# Patient Record
Sex: FEMALE | Race: BLACK OR AFRICAN AMERICAN | NOT HISPANIC OR LATINO | Employment: FULL TIME | URBAN - METROPOLITAN AREA
[De-identification: names, ages, dates, MRNs, and addresses within clinical notes are randomized per-mention and may not be internally consistent; named-entity substitution may affect disease eponyms.]

---

## 2024-06-15 ENCOUNTER — HOSPITAL ENCOUNTER (OUTPATIENT)
Facility: HOSPITAL | Age: 29
Discharge: HOME | End: 2024-06-15
Attending: OBSTETRICS & GYNECOLOGY | Admitting: OBSTETRICS & GYNECOLOGY
Payer: COMMERCIAL

## 2024-06-15 VITALS
DIASTOLIC BLOOD PRESSURE: 67 MMHG | TEMPERATURE: 98 F | BODY MASS INDEX: 44.3 KG/M2 | SYSTOLIC BLOOD PRESSURE: 122 MMHG | WEIGHT: 250 LBS | OXYGEN SATURATION: 95 % | HEART RATE: 79 BPM | RESPIRATION RATE: 16 BRPM | HEIGHT: 63 IN

## 2024-06-15 PROBLEM — Z34.90 PREGNANT AND NOT YET DELIVERED: Status: ACTIVE | Noted: 2024-06-15

## 2024-06-15 LAB
ALBUMIN SERPL-MCNC: 3.3 G/DL (ref 3.5–5.2)
ALBUMIN/GLOB SERPL: 0.8 G/DL
ALP SERPL-CCNC: 146 U/L (ref 39–117)
ALT SERPL W P-5'-P-CCNC: 5 U/L (ref 1–33)
ANION GAP SERPL CALCULATED.3IONS-SCNC: 10.8 MMOL/L (ref 5–15)
AST SERPL-CCNC: 13 U/L (ref 1–32)
BILIRUB SERPL-MCNC: 0.3 MG/DL (ref 0–1.2)
BUN SERPL-MCNC: 4 MG/DL (ref 6–20)
BUN/CREAT SERPL: 7.3 (ref 7–25)
CALCIUM SPEC-SCNC: 8.6 MG/DL (ref 8.6–10.5)
CHLORIDE SERPL-SCNC: 106 MMOL/L (ref 98–107)
CO2 SERPL-SCNC: 22.2 MMOL/L (ref 22–29)
CREAT SERPL-MCNC: 0.55 MG/DL (ref 0.57–1)
CREAT UR-MCNC: 71.1 MG/DL
DEPRECATED RDW RBC AUTO: 39 FL (ref 37–54)
EGFRCR SERPLBLD CKD-EPI 2021: 126.6 ML/MIN/1.73
ERYTHROCYTE [DISTWIDTH] IN BLOOD BY AUTOMATED COUNT: 16 % (ref 12.3–15.4)
GLOBULIN UR ELPH-MCNC: 4 GM/DL
GLUCOSE SERPL-MCNC: 69 MG/DL (ref 65–99)
HCT VFR BLD AUTO: 31.3 % (ref 34–46.6)
HGB BLD-MCNC: 9.2 G/DL (ref 12–15.9)
MCH RBC QN AUTO: 20.2 PG (ref 26.6–33)
MCHC RBC AUTO-ENTMCNC: 29.4 G/DL (ref 31.5–35.7)
MCV RBC AUTO: 68.6 FL (ref 79–97)
PLATELET # BLD AUTO: 463 10*3/MM3 (ref 140–450)
PMV BLD AUTO: 9.9 FL (ref 6–12)
POTASSIUM SERPL-SCNC: 4.1 MMOL/L (ref 3.5–5.2)
PROT ?TM UR-MCNC: 9.9 MG/DL
PROT SERPL-MCNC: 7.3 G/DL (ref 6–8.5)
PROT/CREAT UR: 139.2 MG/G CREA (ref 0–200)
RBC # BLD AUTO: 4.56 10*6/MM3 (ref 3.77–5.28)
SODIUM SERPL-SCNC: 139 MMOL/L (ref 136–145)
WBC NRBC COR # BLD AUTO: 8.77 10*3/MM3 (ref 3.4–10.8)

## 2024-06-15 PROCEDURE — 82570 ASSAY OF URINE CREATININE: CPT | Performed by: OBSTETRICS & GYNECOLOGY

## 2024-06-15 PROCEDURE — 85027 COMPLETE CBC AUTOMATED: CPT | Performed by: OBSTETRICS & GYNECOLOGY

## 2024-06-15 PROCEDURE — 84156 ASSAY OF PROTEIN URINE: CPT | Performed by: OBSTETRICS & GYNECOLOGY

## 2024-06-15 PROCEDURE — G0463 HOSPITAL OUTPT CLINIC VISIT: HCPCS

## 2024-06-15 PROCEDURE — 80053 COMPREHEN METABOLIC PANEL: CPT | Performed by: OBSTETRICS & GYNECOLOGY

## 2024-06-15 RX ORDER — PRENATAL VIT NO.126/IRON/FOLIC 28MG-0.8MG
1 TABLET ORAL DAILY
COMMUNITY

## 2024-06-15 RX ORDER — SENNOSIDES 8.6 MG
650 CAPSULE ORAL EVERY 8 HOURS PRN
COMMUNITY

## 2024-07-02 ENCOUNTER — HOSPITAL ENCOUNTER (OUTPATIENT)
Facility: HOSPITAL | Age: 29
Setting detail: OBSERVATION
Discharge: HOME | End: 2024-07-03
Attending: OBSTETRICS & GYNECOLOGY | Admitting: OBSTETRICS & GYNECOLOGY
Payer: COMMERCIAL

## 2024-07-02 PROBLEM — Z34.90 PREGNANCY: Status: ACTIVE | Noted: 2024-07-02

## 2024-07-02 LAB
ALBUMIN SERPL-MCNC: 3.4 G/DL (ref 3.5–5.2)
ALBUMIN/GLOB SERPL: 0.8 G/DL
ALP SERPL-CCNC: 193 U/L (ref 39–117)
ALT SERPL W P-5'-P-CCNC: 5 U/L (ref 1–33)
ANION GAP SERPL CALCULATED.3IONS-SCNC: 12.5 MMOL/L (ref 5–15)
AST SERPL-CCNC: 28 U/L (ref 1–32)
BASOPHILS # BLD AUTO: 0.01 10*3/MM3 (ref 0–0.2)
BASOPHILS NFR BLD AUTO: 0.1 % (ref 0–1.5)
BILIRUB SERPL-MCNC: 0.4 MG/DL (ref 0–1.2)
BUN SERPL-MCNC: 3 MG/DL (ref 6–20)
BUN/CREAT SERPL: 5.9 (ref 7–25)
CALCIUM SPEC-SCNC: 8.6 MG/DL (ref 8.6–10.5)
CHLORIDE SERPL-SCNC: 106 MMOL/L (ref 98–107)
CO2 SERPL-SCNC: 19.5 MMOL/L (ref 22–29)
CREAT SERPL-MCNC: 0.51 MG/DL (ref 0.57–1)
CREAT UR-MCNC: 73.2 MG/DL
DEPRECATED RDW RBC AUTO: 45 FL (ref 37–54)
EGFRCR SERPLBLD CKD-EPI 2021: 129 ML/MIN/1.73
EOSINOPHIL # BLD AUTO: 0.02 10*3/MM3 (ref 0–0.4)
EOSINOPHIL NFR BLD AUTO: 0.2 % (ref 0.3–6.2)
ERYTHROCYTE [DISTWIDTH] IN BLOOD BY AUTOMATED COUNT: 19.2 % (ref 12.3–15.4)
GLOBULIN UR ELPH-MCNC: 4.1 GM/DL
GLUCOSE SERPL-MCNC: 69 MG/DL (ref 65–99)
HCT VFR BLD AUTO: 33.8 % (ref 34–46.6)
HGB BLD-MCNC: 9.9 G/DL (ref 12–15.9)
HOLD SPECIMEN: NORMAL
IMM GRANULOCYTES # BLD AUTO: 0.05 10*3/MM3 (ref 0–0.05)
IMM GRANULOCYTES NFR BLD AUTO: 0.5 % (ref 0–0.5)
LYMPHOCYTES # BLD AUTO: 3.27 10*3/MM3 (ref 0.7–3.1)
LYMPHOCYTES NFR BLD AUTO: 35.1 % (ref 19.6–45.3)
MCH RBC QN AUTO: 20.5 PG (ref 26.6–33)
MCHC RBC AUTO-ENTMCNC: 29.3 G/DL (ref 31.5–35.7)
MCV RBC AUTO: 70.1 FL (ref 79–97)
MONOCYTES # BLD AUTO: 0.51 10*3/MM3 (ref 0.1–0.9)
MONOCYTES NFR BLD AUTO: 5.5 % (ref 5–12)
NEUTROPHILS NFR BLD AUTO: 5.46 10*3/MM3 (ref 1.7–7)
NEUTROPHILS NFR BLD AUTO: 58.6 % (ref 42.7–76)
NRBC BLD AUTO-RTO: 0 /100 WBC (ref 0–0.2)
PLATELET # BLD AUTO: 497 10*3/MM3 (ref 140–450)
PMV BLD AUTO: 10.7 FL (ref 6–12)
POTASSIUM SERPL-SCNC: 4.6 MMOL/L (ref 3.5–5.2)
PROT ?TM UR-MCNC: 20.5 MG/DL
PROT SERPL-MCNC: 7.5 G/DL (ref 6–8.5)
PROT/CREAT UR: 280.1 MG/G CREA (ref 0–200)
RBC # BLD AUTO: 4.82 10*6/MM3 (ref 3.77–5.28)
SODIUM SERPL-SCNC: 138 MMOL/L (ref 136–145)
WBC NRBC COR # BLD AUTO: 9.32 10*3/MM3 (ref 3.4–10.8)

## 2024-07-02 PROCEDURE — G0463 HOSPITAL OUTPT CLINIC VISIT: HCPCS

## 2024-07-02 PROCEDURE — 25810000003 LACTATED RINGERS SOLUTION: Performed by: OBSTETRICS & GYNECOLOGY

## 2024-07-02 PROCEDURE — 84156 ASSAY OF PROTEIN URINE: CPT | Performed by: OBSTETRICS & GYNECOLOGY

## 2024-07-02 PROCEDURE — 85025 COMPLETE CBC W/AUTO DIFF WBC: CPT | Performed by: OBSTETRICS & GYNECOLOGY

## 2024-07-02 PROCEDURE — 82570 ASSAY OF URINE CREATININE: CPT | Performed by: OBSTETRICS & GYNECOLOGY

## 2024-07-02 PROCEDURE — 80053 COMPREHEN METABOLIC PANEL: CPT | Performed by: OBSTETRICS & GYNECOLOGY

## 2024-07-02 PROCEDURE — G0378 HOSPITAL OBSERVATION PER HR: HCPCS

## 2024-07-02 RX ORDER — ASPIRIN 81 MG/1
81 TABLET, CHEWABLE ORAL DAILY
COMMUNITY

## 2024-07-02 RX ORDER — LIDOCAINE HYDROCHLORIDE 10 MG/ML
0.5 INJECTION, SOLUTION EPIDURAL; INFILTRATION; INTRACAUDAL; PERINEURAL ONCE AS NEEDED
Status: DISCONTINUED | OUTPATIENT
Start: 2024-07-02 | End: 2024-07-02 | Stop reason: HOSPADM

## 2024-07-02 RX ORDER — FERROUS SULFATE 324(65)MG
324 TABLET, DELAYED RELEASE (ENTERIC COATED) ORAL
COMMUNITY
End: 2024-07-12 | Stop reason: HOSPADM

## 2024-07-02 RX ORDER — SODIUM CHLORIDE, SODIUM LACTATE, POTASSIUM CHLORIDE, CALCIUM CHLORIDE 600; 310; 30; 20 MG/100ML; MG/100ML; MG/100ML; MG/100ML
125 INJECTION, SOLUTION INTRAVENOUS CONTINUOUS
Status: DISCONTINUED | OUTPATIENT
Start: 2024-07-02 | End: 2024-07-03 | Stop reason: HOSPADM

## 2024-07-02 RX ORDER — SODIUM CHLORIDE 0.9 % (FLUSH) 0.9 %
10 SYRINGE (ML) INJECTION EVERY 12 HOURS SCHEDULED
Status: DISCONTINUED | OUTPATIENT
Start: 2024-07-02 | End: 2024-07-02 | Stop reason: HOSPADM

## 2024-07-02 RX ORDER — SODIUM CHLORIDE 0.9 % (FLUSH) 0.9 %
10 SYRINGE (ML) INJECTION AS NEEDED
Status: DISCONTINUED | OUTPATIENT
Start: 2024-07-02 | End: 2024-07-02 | Stop reason: HOSPADM

## 2024-07-02 RX ORDER — SODIUM CHLORIDE 9 MG/ML
40 INJECTION, SOLUTION INTRAVENOUS AS NEEDED
Status: DISCONTINUED | OUTPATIENT
Start: 2024-07-02 | End: 2024-07-02 | Stop reason: HOSPADM

## 2024-07-02 RX ADMIN — SODIUM CHLORIDE, SODIUM LACTATE, POTASSIUM CHLORIDE, AND CALCIUM CHLORIDE 500 ML: 600; 310; 30; 20 INJECTION, SOLUTION INTRAVENOUS at 19:12

## 2024-07-03 VITALS
RESPIRATION RATE: 19 BRPM | HEART RATE: 72 BPM | TEMPERATURE: 98.4 F | SYSTOLIC BLOOD PRESSURE: 136 MMHG | HEIGHT: 63 IN | DIASTOLIC BLOOD PRESSURE: 83 MMHG | BODY MASS INDEX: 43.87 KG/M2 | OXYGEN SATURATION: 99 % | WEIGHT: 247.58 LBS

## 2024-07-03 LAB
COLLECT DURATION TIME UR: 12 HRS
PROT 24H UR-MRATE: 111.7 MG/24HOURS (ref 0–150)
SPECIMEN VOL 24H UR: 570 ML

## 2024-07-03 PROCEDURE — 96361 HYDRATE IV INFUSION ADD-ON: CPT

## 2024-07-03 PROCEDURE — G0378 HOSPITAL OBSERVATION PER HR: HCPCS

## 2024-07-03 PROCEDURE — 25810000003 LACTATED RINGERS PER 1000 ML: Performed by: OBSTETRICS & GYNECOLOGY

## 2024-07-03 PROCEDURE — 81050 URINALYSIS VOLUME MEASURE: CPT | Performed by: OBSTETRICS & GYNECOLOGY

## 2024-07-03 PROCEDURE — 84156 ASSAY OF PROTEIN URINE: CPT | Performed by: OBSTETRICS & GYNECOLOGY

## 2024-07-03 PROCEDURE — 96360 HYDRATION IV INFUSION INIT: CPT

## 2024-07-03 RX ADMIN — SODIUM CHLORIDE, SODIUM LACTATE, POTASSIUM CHLORIDE, AND CALCIUM CHLORIDE 125 ML/HR: .6; .31; .03; .02 INJECTION, SOLUTION INTRAVENOUS at 02:16

## 2024-07-03 NOTE — PLAN OF CARE
Goal Outcome Evaluation:         Blood pressures stable overnight, tolerating regular diet, voiding adequately. No c/o contractions, pain, or leakage of fluid

## 2024-07-03 NOTE — SIGNIFICANT NOTE
Case Management Discharge Note                Selected Continued Care - Discharged on 7/3/2024 Admission date: 7/2/2024 - Discharge disposition: Home or Self Care              Transportation Services  Private: Car    Final Discharge Disposition Code: (P) 01 - home or self-care

## 2024-07-03 NOTE — DISCHARGE SUMMARY
Date of Discharge:  7/3/2024    Discharge Diagnosis: ***    Presenting Problem/History of Present Illness:  Active Hospital Problems    Diagnosis  POA    **Pregnancy [Z34.90]  Not Applicable      Resolved Hospital Problems   No resolved problems to display.        ***  Hospital Course:  Patient is a 29 y.o.  presented with ***    Procedures Performed:         Consults:   Consults       No orders found for last 30 day(s).            Pertinent Test Results:   Lab Results (last 72 hours)       Procedure Component Value Units Date/Time    Protein, Urine, 24 Hour - Urine, Clean Catch [[id number]] Collected: 24    Specimen: 24 Hour Urine from Urine, Clean Catch Updated: 24     Protein, 24H Urine 111.7 mg/24hours      24H Urine Volume 570 mL      Time (Hours) 12 hrs     CBC & Differential [[id number]]  (Abnormal) Collected: 24    Specimen: Blood from Hand, Right Updated: 24    Narrative:      The following orders were created for panel order CBC & Differential.  Procedure                               Abnormality         Status                     ---------                               -----------         ------                     CBC Auto Differential[[id number]]        Abnormal            Final result               Scan Slide[[id number]]                                                                    Please view results for these tests on the individual orders.    CBC Auto Differential [[id number]]  (Abnormal) Collected: 24    Specimen: Blood from Hand, Right Updated: 24     WBC 9.32 10*3/mm3      RBC 4.82 10*6/mm3      Hemoglobin 9.9 g/dL      Hematocrit 33.8 %      MCV 70.1 fL      MCH 20.5 pg      MCHC 29.3 g/dL      RDW 19.2 %      RDW-SD 45.0 fl      MPV 10.7 fL      Platelets 497 10*3/mm3      Neutrophil % 58.6 %      Lymphocyte % 35.1 %      Monocyte % 5.5 %      Eosinophil % 0.2 %      Basophil % 0.1 %      Immature Grans % 0.5 %       Neutrophils, Absolute 5.46 10*3/mm3      Lymphocytes, Absolute 3.27 10*3/mm3      Monocytes, Absolute 0.51 10*3/mm3      Eosinophils, Absolute 0.02 10*3/mm3      Basophils, Absolute 0.01 10*3/mm3      Immature Grans, Absolute 0.05 10*3/mm3      nRBC 0.0 /100 WBC     Narrative:      Appended report. These results have been appended to a previously verified report.    Comprehensive Metabolic Panel [[id number]]  (Abnormal) Collected: 07/02/24 1753    Specimen: Blood from Hand, Right Updated: 07/02/24 1957     Glucose 69 mg/dL      BUN 3 mg/dL      Creatinine 0.51 mg/dL      Sodium 138 mmol/L      Potassium 4.6 mmol/L      Comment: Slight hemolysis detected by analyzer. Result may be falsely elevated.        Chloride 106 mmol/L      CO2 19.5 mmol/L      Calcium 8.6 mg/dL      Total Protein 7.5 g/dL      Albumin 3.4 g/dL      ALT (SGPT) 5 U/L      AST (SGOT) 28 U/L      Comment: Slight hemolysis detected by analyzer. Result may be falsely elevated.        Alkaline Phosphatase 193 U/L      Total Bilirubin 0.4 mg/dL      Globulin 4.1 gm/dL      A/G Ratio 0.8 g/dL      BUN/Creatinine Ratio 5.9     Anion Gap 12.5 mmol/L      eGFR 129.0 mL/min/1.73     Narrative:      GFR Normal >60  Chronic Kidney Disease <60  Kidney Failure <15      Protein / Creatinine Ratio, Urine - Urine, Clean Catch [[id number]]  (Abnormal) Collected: 07/02/24 1718    Specimen: Urine, Clean Catch Updated: 07/02/24 1951     Protein/Creatinine Ratio, Urine 280.1 mg/G Crea      Creatinine, Urine 73.2 mg/dL      Total Protein, Urine 20.5 mg/dL     Emmalena Draw [[id number]] Collected: 07/02/24 1753    Specimen: Blood from Hand, Right Updated: 07/02/24 1931    Narrative:      The following orders were created for panel order Emmalena Draw.  Procedure                               Abnormality         Status                     ---------                               -----------         ------                     Gold Top - SST[[id number]]                                    Final result                 Please view results for these tests on the individual orders.    [facility] [[id number]] Collected: 07/02/24 1753    Specimen: Blood from Hand, Right Updated: 07/02/24 1931     Extra Tube Hold for add-ons.     Comment: Auto resulted.             Imaging Results (Last 72 Hours)       ** No results found for the last 72 hours. **            Condition on Discharge:  Good    Vital Signs:  Vitals:    07/02/24 2200 07/02/24 2215 07/02/24 2243 07/03/24 0339   BP: 152/73 131/75 138/83 136/83   BP Location:   Left arm Left arm   Patient Position:   Sitting Lying   Pulse: 77 69 81 72   Resp:   17 19   Temp:   97 °F (36.1 °C) 98.4 °F (36.9 °C)   TempSrc:   Oral Oral   SpO2: 97% 96% 95% 99%   Weight:       Height:           Physical Exam:     General Appearance:    Alert, cooperative, in no acute distress   Abdomen:     Soft, non-tender, non-distended, no guarding, no rebound        tenderness   Genitalia:    Deferred   Extremities:   Moves all extremities well, no edema, no cyanosis, no             redness       Discharge Disposition:  Home    Discharge Medications:     Discharge Medications        Continue These Medications        Instructions Start Date   acetaminophen 650 MG 8 hr tablet  Commonly known as: TYLENOL   650 mg, Oral, Every 8 Hours PRN      aspirin 81 MG chewable tablet   81 mg, Oral, Daily      ferrous sulfate 324 (65 Fe) MG tablet delayed-release EC tablet   324 mg, Oral, Daily With Breakfast      prenatal (CLASSIC) vitamin 28-0.8 MG tablet tablet  Generic drug: prenatal vitamin   1 tablet, Oral, Daily               Activity at Discharge:   Activity Instructions       Bathing Restrictions      Type of Restriction: Bathing    Bathing Restrictions: No Tub Bath    Driving Restrictions      Type of Restriction: Driving    Driving Restrictions: No Driving While Taking Narcotics    Lifting Restrictions      Type of Restriction: Lifting    Lifting Restrictions: Lifting  Restriction (Indicate Limit)    Weight Limit (Pounds): 10    Length of Lifting Restriction: 2 weeks    Sexual Activity Restrictions      Type of Restriction: Sex    Explain Sexual Activity Restrictions: Pelvic rest for 6 weeks.            Follow-up Appointments  No future appointments.  Additional Instructions for the Follow-ups that You Need to Schedule       Call MD With Problems / Concerns   As directed      Instructions: Temperature >100.4  Bleeding >1 pad per hour  Depressed mood  Pain not controlled by pain medications.    Order Comments: Instructions: Temperature >100.4 Bleeding >1 pad per hour Depressed mood Pain not controlled by pain medications.         Discharge Follow-up with Specialty: OBGYN; 1 Week   As directed      Specialty: OBGYN   Follow Up: 1 Week                Test Results Pending at Discharge  Pending Labs       Order Current Status    Blood Bank Hold Tube In process             Agata Schultz MD  07/03/24  07:41 EDT

## 2024-07-09 ENCOUNTER — ANESTHESIA (OUTPATIENT)
Dept: LABOR AND DELIVERY | Facility: HOSPITAL | Age: 29
End: 2024-07-09
Payer: COMMERCIAL

## 2024-07-09 ENCOUNTER — HOSPITAL ENCOUNTER (INPATIENT)
Facility: HOSPITAL | Age: 29
LOS: 3 days | Discharge: HOME | End: 2024-07-12
Attending: OBSTETRICS & GYNECOLOGY | Admitting: OBSTETRICS & GYNECOLOGY
Payer: COMMERCIAL

## 2024-07-09 ENCOUNTER — ANESTHESIA EVENT (OUTPATIENT)
Dept: LABOR AND DELIVERY | Facility: HOSPITAL | Age: 29
End: 2024-07-09
Payer: COMMERCIAL

## 2024-07-09 DIAGNOSIS — Z34.93 PREGNANT AND NOT YET DELIVERED IN THIRD TRIMESTER: Primary | ICD-10-CM

## 2024-07-09 PROBLEM — O13.3 GESTATIONAL HTN W/O SIGNIFICANT PROTEINURIA, THIRD TRIMESTER: Status: ACTIVE | Noted: 2024-07-09

## 2024-07-09 LAB
ABO GROUP BLD: NORMAL
BLD GP AB SCN SERPL QL: NEGATIVE
DEPRECATED RDW RBC AUTO: 46.3 FL (ref 37–54)
ERYTHROCYTE [DISTWIDTH] IN BLOOD BY AUTOMATED COUNT: 19.2 % (ref 12.3–15.4)
HCT VFR BLD AUTO: 35 % (ref 34–46.6)
HGB BLD-MCNC: 10.2 G/DL (ref 12–15.9)
MCH RBC QN AUTO: 20.4 PG (ref 26.6–33)
MCHC RBC AUTO-ENTMCNC: 29.1 G/DL (ref 31.5–35.7)
MCV RBC AUTO: 70 FL (ref 79–97)
PLATELET # BLD AUTO: 431 10*3/MM3 (ref 140–450)
PMV BLD AUTO: 10.5 FL (ref 6–12)
RBC # BLD AUTO: 5 10*6/MM3 (ref 3.77–5.28)
RH BLD: NEGATIVE
T&S EXPIRATION DATE: NORMAL
TREPONEMA PALLIDUM IGG+IGM AB [PRESENCE] IN SERUM OR PLASMA BY IMMUNOASSAY: NORMAL
WBC NRBC COR # BLD AUTO: 9.1 10*3/MM3 (ref 3.4–10.8)

## 2024-07-09 PROCEDURE — 85027 COMPLETE CBC AUTOMATED: CPT | Performed by: OBSTETRICS & GYNECOLOGY

## 2024-07-09 PROCEDURE — 25010000002 MORPHINE PER 10 MG: Performed by: ANESTHESIOLOGY

## 2024-07-09 PROCEDURE — 25010000002 KETOROLAC TROMETHAMINE PER 15 MG: Performed by: NURSE ANESTHETIST, CERTIFIED REGISTERED

## 2024-07-09 PROCEDURE — 25810000003 LACTATED RINGERS SOLUTION: Performed by: OBSTETRICS & GYNECOLOGY

## 2024-07-09 PROCEDURE — 86900 BLOOD TYPING SEROLOGIC ABO: CPT | Performed by: OBSTETRICS & GYNECOLOGY

## 2024-07-09 PROCEDURE — 86850 RBC ANTIBODY SCREEN: CPT | Performed by: OBSTETRICS & GYNECOLOGY

## 2024-07-09 PROCEDURE — 25010000002 KETOROLAC TROMETHAMINE PER 15 MG: Performed by: OBSTETRICS & GYNECOLOGY

## 2024-07-09 PROCEDURE — 25010000002 PHENYLEPHRINE 10 MG/ML SOLUTION: Performed by: NURSE ANESTHETIST, CERTIFIED REGISTERED

## 2024-07-09 PROCEDURE — 86901 BLOOD TYPING SEROLOGIC RH(D): CPT | Performed by: OBSTETRICS & GYNECOLOGY

## 2024-07-09 PROCEDURE — 25010000002 HYDROMORPHONE 1 MG/ML SOLUTION: Performed by: NURSE ANESTHETIST, CERTIFIED REGISTERED

## 2024-07-09 PROCEDURE — 25810000003 LACTATED RINGERS PER 1000 ML: Performed by: NURSE ANESTHETIST, CERTIFIED REGISTERED

## 2024-07-09 PROCEDURE — 86900 BLOOD TYPING SEROLOGIC ABO: CPT

## 2024-07-09 PROCEDURE — 25010000002 OXYTOCIN PER 10 UNITS: Performed by: NURSE ANESTHETIST, CERTIFIED REGISTERED

## 2024-07-09 PROCEDURE — 86780 TREPONEMA PALLIDUM: CPT | Performed by: OBSTETRICS & GYNECOLOGY

## 2024-07-09 PROCEDURE — 86901 BLOOD TYPING SEROLOGIC RH(D): CPT

## 2024-07-09 PROCEDURE — 88307 TISSUE EXAM BY PATHOLOGIST: CPT | Performed by: OBSTETRICS & GYNECOLOGY

## 2024-07-09 PROCEDURE — 25010000002 BUPIVACAINE IN DEXTROSE 0.75-8.25 % SOLUTION: Performed by: ANESTHESIOLOGY

## 2024-07-09 PROCEDURE — 25010000002 FENTANYL CITRATE (PF) 250 MCG/5ML SOLUTION: Performed by: ANESTHESIOLOGY

## 2024-07-09 PROCEDURE — 25010000002 CEFAZOLIN PER 500 MG: Performed by: OBSTETRICS & GYNECOLOGY

## 2024-07-09 RX ORDER — ACETAMINOPHEN 500 MG
1000 TABLET ORAL ONCE
Status: COMPLETED | OUTPATIENT
Start: 2024-07-09 | End: 2024-07-09

## 2024-07-09 RX ORDER — OXYCODONE HYDROCHLORIDE 5 MG/1
5 TABLET ORAL EVERY 4 HOURS PRN
Status: DISCONTINUED | OUTPATIENT
Start: 2024-07-09 | End: 2024-07-12 | Stop reason: HOSPADM

## 2024-07-09 RX ORDER — OXYTOCIN/0.9 % SODIUM CHLORIDE 30/500 ML
PLASTIC BAG, INJECTION (ML) INTRAVENOUS
Status: COMPLETED
Start: 2024-07-09 | End: 2024-07-09

## 2024-07-09 RX ORDER — DIPHENHYDRAMINE HCL 25 MG
25 CAPSULE ORAL EVERY 4 HOURS PRN
Status: DISCONTINUED | OUTPATIENT
Start: 2024-07-09 | End: 2024-07-10

## 2024-07-09 RX ORDER — OXYTOCIN/0.9 % SODIUM CHLORIDE 30/500 ML
999 PLASTIC BAG, INJECTION (ML) INTRAVENOUS ONCE
Status: DISCONTINUED | OUTPATIENT
Start: 2024-07-09 | End: 2024-07-09 | Stop reason: HOSPADM

## 2024-07-09 RX ORDER — DIPHENHYDRAMINE HYDROCHLORIDE 50 MG/ML
25 INJECTION INTRAMUSCULAR; INTRAVENOUS EVERY 4 HOURS PRN
Status: DISCONTINUED | OUTPATIENT
Start: 2024-07-09 | End: 2024-07-10

## 2024-07-09 RX ORDER — FAMOTIDINE 10 MG/ML
20 INJECTION, SOLUTION INTRAVENOUS ONCE AS NEEDED
Status: DISCONTINUED | OUTPATIENT
Start: 2024-07-09 | End: 2024-07-09 | Stop reason: HOSPADM

## 2024-07-09 RX ORDER — PHENYLEPHRINE HYDROCHLORIDE 10 MG/ML
INJECTION INTRAVENOUS AS NEEDED
Status: DISCONTINUED | OUTPATIENT
Start: 2024-07-09 | End: 2024-07-09 | Stop reason: SURG

## 2024-07-09 RX ORDER — DROPERIDOL 2.5 MG/ML
0.62 INJECTION, SOLUTION INTRAMUSCULAR; INTRAVENOUS
Status: DISCONTINUED | OUTPATIENT
Start: 2024-07-09 | End: 2024-07-10

## 2024-07-09 RX ORDER — SODIUM CHLORIDE, SODIUM LACTATE, POTASSIUM CHLORIDE, CALCIUM CHLORIDE 600; 310; 30; 20 MG/100ML; MG/100ML; MG/100ML; MG/100ML
INJECTION, SOLUTION INTRAVENOUS CONTINUOUS PRN
Status: DISCONTINUED | OUTPATIENT
Start: 2024-07-09 | End: 2024-07-09 | Stop reason: SURG

## 2024-07-09 RX ORDER — OXYCODONE HYDROCHLORIDE 5 MG/1
10 TABLET ORAL EVERY 4 HOURS PRN
Status: DISCONTINUED | OUTPATIENT
Start: 2024-07-09 | End: 2024-07-12 | Stop reason: HOSPADM

## 2024-07-09 RX ORDER — DOCUSATE SODIUM 100 MG/1
100 CAPSULE, LIQUID FILLED ORAL 2 TIMES DAILY PRN
Status: DISCONTINUED | OUTPATIENT
Start: 2024-07-09 | End: 2024-07-12 | Stop reason: HOSPADM

## 2024-07-09 RX ORDER — SODIUM CHLORIDE 0.9 % (FLUSH) 0.9 %
10 SYRINGE (ML) INJECTION EVERY 12 HOURS SCHEDULED
Status: DISCONTINUED | OUTPATIENT
Start: 2024-07-09 | End: 2024-07-09 | Stop reason: HOSPADM

## 2024-07-09 RX ORDER — OXYTOCIN 10 [USP'U]/ML
INJECTION, SOLUTION INTRAMUSCULAR; INTRAVENOUS AS NEEDED
Status: DISCONTINUED | OUTPATIENT
Start: 2024-07-09 | End: 2024-07-09 | Stop reason: SURG

## 2024-07-09 RX ORDER — IBUPROFEN 600 MG/1
600 TABLET ORAL EVERY 6 HOURS
Status: DISCONTINUED | OUTPATIENT
Start: 2024-07-11 | End: 2024-07-12 | Stop reason: HOSPADM

## 2024-07-09 RX ORDER — ACETAMINOPHEN 325 MG/1
650 TABLET ORAL EVERY 6 HOURS
Status: DISCONTINUED | OUTPATIENT
Start: 2024-07-10 | End: 2024-07-12 | Stop reason: HOSPADM

## 2024-07-09 RX ORDER — ACETAMINOPHEN 500 MG
1000 TABLET ORAL EVERY 6 HOURS
Status: COMPLETED | OUTPATIENT
Start: 2024-07-09 | End: 2024-07-10

## 2024-07-09 RX ORDER — ENOXAPARIN SODIUM 100 MG/ML
40 INJECTION SUBCUTANEOUS EVERY 12 HOURS
Status: DISCONTINUED | OUTPATIENT
Start: 2024-07-10 | End: 2024-07-12 | Stop reason: HOSPADM

## 2024-07-09 RX ORDER — KETOROLAC TROMETHAMINE 30 MG/ML
15 INJECTION, SOLUTION INTRAMUSCULAR; INTRAVENOUS EVERY 6 HOURS
Status: COMPLETED | OUTPATIENT
Start: 2024-07-10 | End: 2024-07-10

## 2024-07-09 RX ORDER — ONDANSETRON 2 MG/ML
4 INJECTION INTRAMUSCULAR; INTRAVENOUS ONCE AS NEEDED
Status: DISCONTINUED | OUTPATIENT
Start: 2024-07-09 | End: 2024-07-10

## 2024-07-09 RX ORDER — MORPHINE SULFATE 2 MG/ML
2 INJECTION, SOLUTION INTRAMUSCULAR; INTRAVENOUS
Status: ACTIVE | OUTPATIENT
Start: 2024-07-09 | End: 2024-07-09

## 2024-07-09 RX ORDER — OXYTOCIN/0.9 % SODIUM CHLORIDE 30/500 ML
PLASTIC BAG, INJECTION (ML) INTRAVENOUS CONTINUOUS PRN
Status: DISCONTINUED | OUTPATIENT
Start: 2024-07-09 | End: 2024-07-09 | Stop reason: SURG

## 2024-07-09 RX ORDER — OXYTOCIN/0.9 % SODIUM CHLORIDE 30/500 ML
250 PLASTIC BAG, INJECTION (ML) INTRAVENOUS CONTINUOUS
Status: ACTIVE | OUTPATIENT
Start: 2024-07-09 | End: 2024-07-09

## 2024-07-09 RX ORDER — METOCLOPRAMIDE HYDROCHLORIDE 5 MG/ML
10 INJECTION INTRAMUSCULAR; INTRAVENOUS ONCE AS NEEDED
Status: DISCONTINUED | OUTPATIENT
Start: 2024-07-09 | End: 2024-07-09 | Stop reason: HOSPADM

## 2024-07-09 RX ORDER — OXYTOCIN-SODIUM CHLORIDE 0.9% IV SOLN 30 UNIT/500ML 30-0.9/5 UT/ML-%
125 SOLUTION INTRAVENOUS ONCE AS NEEDED
Status: DISCONTINUED | OUTPATIENT
Start: 2024-07-09 | End: 2024-07-12 | Stop reason: HOSPADM

## 2024-07-09 RX ORDER — SODIUM CHLORIDE 9 MG/ML
40 INJECTION, SOLUTION INTRAVENOUS AS NEEDED
Status: DISCONTINUED | OUTPATIENT
Start: 2024-07-09 | End: 2024-07-09 | Stop reason: HOSPADM

## 2024-07-09 RX ORDER — KETOROLAC TROMETHAMINE 30 MG/ML
INJECTION, SOLUTION INTRAMUSCULAR; INTRAVENOUS AS NEEDED
Status: DISCONTINUED | OUTPATIENT
Start: 2024-07-09 | End: 2024-07-09 | Stop reason: SURG

## 2024-07-09 RX ORDER — KETOROLAC TROMETHAMINE 30 MG/ML
30 INJECTION, SOLUTION INTRAMUSCULAR; INTRAVENOUS ONCE
Status: COMPLETED | OUTPATIENT
Start: 2024-07-09 | End: 2024-07-09

## 2024-07-09 RX ORDER — MORPHINE SULFATE 1 MG/ML
INJECTION, SOLUTION EPIDURAL; INTRATHECAL; INTRAVENOUS
Status: COMPLETED | OUTPATIENT
Start: 2024-07-09 | End: 2024-07-09

## 2024-07-09 RX ORDER — BUPIVACAINE HYDROCHLORIDE 7.5 MG/ML
INJECTION, SOLUTION INTRASPINAL
Status: COMPLETED | OUTPATIENT
Start: 2024-07-09 | End: 2024-07-09

## 2024-07-09 RX ORDER — CITRIC ACID/SODIUM CITRATE 334-500MG
30 SOLUTION, ORAL ORAL ONCE
Status: COMPLETED | OUTPATIENT
Start: 2024-07-09 | End: 2024-07-09

## 2024-07-09 RX ORDER — METHYLERGONOVINE MALEATE 0.2 MG/ML
200 INJECTION INTRAVENOUS ONCE AS NEEDED
Status: DISCONTINUED | OUTPATIENT
Start: 2024-07-09 | End: 2024-07-09 | Stop reason: HOSPADM

## 2024-07-09 RX ORDER — EPHEDRINE SULFATE 5 MG/ML
INJECTION INTRAVENOUS AS NEEDED
Status: DISCONTINUED | OUTPATIENT
Start: 2024-07-09 | End: 2024-07-09 | Stop reason: SURG

## 2024-07-09 RX ORDER — FENTANYL CITRATE 50 UG/ML
INJECTION, SOLUTION INTRAMUSCULAR; INTRAVENOUS
Status: COMPLETED | OUTPATIENT
Start: 2024-07-09 | End: 2024-07-09

## 2024-07-09 RX ORDER — MISOPROSTOL 200 UG/1
800 TABLET ORAL ONCE AS NEEDED
Status: DISCONTINUED | OUTPATIENT
Start: 2024-07-09 | End: 2024-07-09 | Stop reason: HOSPADM

## 2024-07-09 RX ORDER — NALOXONE HCL 0.4 MG/ML
0.2 VIAL (ML) INJECTION
Status: DISCONTINUED | OUTPATIENT
Start: 2024-07-09 | End: 2024-07-10

## 2024-07-09 RX ORDER — SIMETHICONE 80 MG
80 TABLET,CHEWABLE ORAL 4 TIMES DAILY PRN
Status: DISCONTINUED | OUTPATIENT
Start: 2024-07-09 | End: 2024-07-12 | Stop reason: HOSPADM

## 2024-07-09 RX ORDER — CARBOPROST TROMETHAMINE 250 UG/ML
250 INJECTION, SOLUTION INTRAMUSCULAR
Status: DISCONTINUED | OUTPATIENT
Start: 2024-07-09 | End: 2024-07-09 | Stop reason: HOSPADM

## 2024-07-09 RX ORDER — SODIUM CHLORIDE 0.9 % (FLUSH) 0.9 %
10 SYRINGE (ML) INJECTION AS NEEDED
Status: DISCONTINUED | OUTPATIENT
Start: 2024-07-09 | End: 2024-07-09 | Stop reason: HOSPADM

## 2024-07-09 RX ADMIN — PHENYLEPHRINE HYDROCHLORIDE 100 MCG: 10 INJECTION INTRAVENOUS at 17:25

## 2024-07-09 RX ADMIN — FENTANYL CITRATE 15 MCG: 50 INJECTION, SOLUTION INTRAMUSCULAR; INTRAVENOUS at 17:21

## 2024-07-09 RX ADMIN — BUPIVACAINE HYDROCHLORIDE IN DEXTROSE 1.5 ML: 7.5 INJECTION, SOLUTION SUBARACHNOID at 17:21

## 2024-07-09 RX ADMIN — KETOROLAC TROMETHAMINE 30 MG: 30 INJECTION, SOLUTION INTRAMUSCULAR at 18:29

## 2024-07-09 RX ADMIN — SODIUM CHLORIDE, SODIUM LACTATE, POTASSIUM CHLORIDE, AND CALCIUM CHLORIDE: .6; .31; .03; .02 INJECTION, SOLUTION INTRAVENOUS at 18:19

## 2024-07-09 RX ADMIN — HYDROMORPHONE HYDROCHLORIDE 0.5 MG: 1 INJECTION, SOLUTION INTRAMUSCULAR; INTRAVENOUS; SUBCUTANEOUS at 20:51

## 2024-07-09 RX ADMIN — EPHEDRINE SULFATE 5 MG: 5 INJECTION INTRAVENOUS at 17:23

## 2024-07-09 RX ADMIN — ACETAMINOPHEN 1000 MG: 500 TABLET, FILM COATED ORAL at 23:24

## 2024-07-09 RX ADMIN — SODIUM CHLORIDE, SODIUM LACTATE, POTASSIUM CHLORIDE, AND CALCIUM CHLORIDE: .6; .31; .03; .02 INJECTION, SOLUTION INTRAVENOUS at 17:14

## 2024-07-09 RX ADMIN — SODIUM CITRATE AND CITRIC ACID MONOHYDRATE 30 ML: 500; 334 SOLUTION ORAL at 17:08

## 2024-07-09 RX ADMIN — KETOROLAC TROMETHAMINE 30 MG: 30 INJECTION, SOLUTION INTRAMUSCULAR at 19:40

## 2024-07-09 RX ADMIN — PHENYLEPHRINE HYDROCHLORIDE 100 MCG: 10 INJECTION INTRAVENOUS at 17:32

## 2024-07-09 RX ADMIN — ACETAMINOPHEN 1000 MG: 500 TABLET, FILM COATED ORAL at 17:07

## 2024-07-09 RX ADMIN — Medication 125 ML/HR: at 17:51

## 2024-07-09 RX ADMIN — Medication 10 ML: at 16:22

## 2024-07-09 RX ADMIN — OXYTOCIN 3 UNITS: 10 INJECTION INTRAVENOUS at 17:51

## 2024-07-09 RX ADMIN — SODIUM CHLORIDE, POTASSIUM CHLORIDE, SODIUM LACTATE AND CALCIUM CHLORIDE 1000 ML: 600; 310; 30; 20 INJECTION, SOLUTION INTRAVENOUS at 16:24

## 2024-07-09 RX ADMIN — PHENYLEPHRINE HYDROCHLORIDE 100 MCG: 10 INJECTION INTRAVENOUS at 17:35

## 2024-07-09 RX ADMIN — SODIUM CHLORIDE 2000 MG: 900 INJECTION INTRAVENOUS at 16:37

## 2024-07-09 RX ADMIN — PHENYLEPHRINE HYDROCHLORIDE 100 MCG: 10 INJECTION INTRAVENOUS at 17:27

## 2024-07-09 RX ADMIN — MORPHINE SULFATE 0.1 MG: 1 INJECTION, SOLUTION EPIDURAL; INTRATHECAL; INTRAVENOUS at 17:21

## 2024-07-09 NOTE — ANESTHESIA PREPROCEDURE EVALUATION
Anesthesia Evaluation     Patient summary reviewed and Nursing notes reviewed   NPO Solid Status: > 8 hours  NPO Liquid Status: > 8 hours           Airway   Mallampati: II  TM distance: >3 FB  Neck ROM: full  No difficulty expected  Dental - normal exam     Pulmonary    Cardiovascular     (+) hypertension      Neuro/Psych  GI/Hepatic/Renal/Endo    (+) morbid obesity    Musculoskeletal     Abdominal    Substance History      OB/GYN    (+) Pregnant, pregnancy induced hypertension        Other                    Anesthesia Plan    ASA 3     spinal       Anesthetic plan, risks, benefits, and alternatives have been provided, discussed and informed consent has been obtained with: patient.    Plan discussed with CRNA.    CODE STATUS:    Level Of Support Discussed With: Patient  Code Status (Patient has no pulse and is not breathing): CPR (Attempt to Resuscitate)  Medical Interventions (Patient has pulse or is breathing): Full Support

## 2024-07-09 NOTE — H&P
SIRISHA Duran  Obstetric History and Physical     Chief Complaint: previous CS, GHTN    Subjective     Patient is a 29 y.o. female  currently at 38w2d, who presents for a repeat CS with GHTN.    Her prenatal care is c/b GHTN, anemia, previous CS x 1, late PNC, sickle trait.      Prenatal Information:  External Prenatal Results       Pregnancy Outside Results - Transcribed From Office Records - See Scanned Records For Details       Test Value Date Time    ABO  O  24 1433    Rh  Negative  24 1433    Antibody Screen  Negative  24 1433    Varicella IgG       Rubella       Hgb  10.2 g/dL 24 1433       9.9 g/dL 24 1753       9.2 g/dL 06/15/24 1255    Hct  35.0 % 24 1433       33.8 % 24 1753       31.3 % 06/15/24 1255    HgB A1c        1h GTT       3h GTT Fasting       3h GTT 1 hour       3h GTT 2 hour       3h GTT 3 hour        Gonorrhea (discrete)       Chlamydia (discrete)       RPR       Syphilis Antibody       HBsAg       Herpes Simplex Virus PCR       Herpes Simplex VIrus Culture       HIV       Hep C RNA Quant PCR       Hep C Antibody       AFP       NIPT       Cystic Fibroisis        Group B Strep       GBS Susceptibility to Clindamycin       GBS Susceptibility to Erythromycin       Fetal Fibronectin       Genetic Testing, Maternal Blood                 Drug Screening       Test Value Date Time    Urine Drug Screen       Amphetamine Screen       Barbiturate Screen       Benzodiazepine Screen       Methadone Screen       Phencyclidine Screen       Opiates Screen       THC Screen       Cocaine Screen       Propoxyphene Screen       Buprenorphine Screen       Methamphetamine Screen       Oxycodone Screen       Tricyclic Antidepressants Screen                 Legend    ^: Historical                             Past OB History:    Pregnancy History    #1  [2017]: 7 wks SAB comments: passed on own  #2  [2018]: 6w; SAB comments: passed on own  #3  [2019]: 38w; M/  "Christian 7lb 6oz; Margo C/S; [facility]/  comments: MARLYS  #4        Past Medical History: Past Medical History:   Diagnosis Date    Gestational hypertension     Hidradenitis suppurativa     Urinary tract infection         Past Surgical History Past Surgical History:   Procedure Laterality Date     SECTION          Family History: Family History   Problem Relation Age of Onset    Hypertension Mother     Diabetes Mother     Hypertension Father     Diabetes Father       Social History:  reports that she has never smoked. She has never used smokeless tobacco.   reports no history of alcohol use.   reports no history of drug use.        General ROS: Pertinent items are noted in HPI    Objective      Vitals:     Vitals:    24 1410 24 1435 24 1500   BP: 139/78  132/90   BP Location:   Right arm   Patient Position:   Sitting   Pulse: 92  84   Resp:   18   Temp:   98.7 °F (37.1 °C)   TempSrc:   Oral   SpO2: 99%  97%   Weight:  113 kg (250 lb 3.6 oz)    Height:  160 cm (63\")        Fetal Heart Rate Assessment:   150, mod variability    Adona:   Every 5-8 min     Physical Exam:     General Appearance:    Alert, cooperative, in no acute distress   Abdomen:     Soft, non-tender, EFW 7 lbs   Pelvic Exam:    Presentation: vtx    Cervix: was not checked., pelvis clinically adequate   Extremities:   Moves all extremities well   Skin:   No bleeding, bruising or rash         Laboratory Results:   Lab Results (last 48 hours)       Procedure Component Value Units Date/Time    CBC (No Diff) [[id number]]  (Abnormal) Collected: 24    Specimen: Blood Updated: 24 1449     WBC 9.10 10*3/mm3      RBC 5.00 10*6/mm3      Hemoglobin 10.2 g/dL      Hematocrit 35.0 %      MCV 70.0 fL      MCH 20.4 pg      MCHC 29.1 g/dL      RDW 19.2 %      RDW-SD 46.3 fl      MPV 10.5 fL      Platelets 431 10*3/mm3     Treponema pallidum AB w/Reflex RPR [[id number]] Collected: 24    Specimen: " Blood Updated: 24 1443               Assessment & Plan     Principal Problem:    Pregnant and not yet delivered  Active Problems:    Gestational htn w/o significant proteinuria, third trimester         Assessment:  1.  Intrauterine pregnancy at 38w2d gestation with reassuring fetal status.    2.  Previous CS, declines ZOILA  3.  GBS status: positive  4.  FSR    Plan:  1. Repeat  scheduled       Agata Schultz MD   2024   16:38 EDT

## 2024-07-09 NOTE — ANESTHESIA PROCEDURE NOTES
Spinal Block      Patient location during procedure: OB  Start Time: 7/9/2024 5:16 PM  Stop Time: 7/9/2024 5:21 PM  Indication:at surgeon's request, post-op pain management and procedure for pain  Performed By  Anesthesiologist: Seth Romero MD  CRNA/CAA: Tesfaye Lee CRNA  Preanesthetic Checklist  Completed: patient identified, IV checked, site marked, risks and benefits discussed, surgical consent, monitors and equipment checked, pre-op evaluation and timeout performed  Spinal Block Prep:  Patient Position:sitting  Sterile Tech:cap, gloves, gown, mask and sterile barriers  Prep:Chloraprep  Patient Monitoring:blood pressure monitoring, EKG and continuous pulse oximetry    Spinal Block Procedure  Approach:midline  Guidance:landmark technique and palpation technique  Location:L3-L4  Needle Type:Pencan  Needle Gauge:24 G  Placement of Spinal needle event:cerebrospinal fluid aspirated  Paresthesia: no  Fluid Appearance:clear  Medications: Morphine PF injection - Intrathecal   0.1 mg - 7/9/2024 5:21:00 PM  fentaNYL Citrate (PF) (SUBLIMAZE) injection - Intrathecal   15 mcg - 7/9/2024 5:21:00 PM  bupivacaine in dextrose (MARCAINE SPINAL) 0.75-8.25 % injection - Intrathecal   1.5 mL - 7/9/2024 5:21:00 PM   Post Assessment  Patient Tolerance:patient tolerated the procedure well with no apparent complications  Complications no

## 2024-07-09 NOTE — OP NOTE
Roger   Section Operative Note    Preoperative Dx:   1. Patient is a 29 y.o.  at 38w2d    2. Previous CS, declines ZOILA  3. GHTN      Postoperative Dx:    Same  Dense adhesions of anterior uterus to anterior abdominal wall     Procedure:  Repeat    Surgeon:  Assistant:  Agata Schultz MD   Anesthesia:  Anesthesiologist:  Weston Romero/Rosa TRAN     EBL:  800mL     Antibiotics:  cefazolin (Ancef)     Infant    Findings: LVFI  AB.297/-1.8  VB.351/-2.6  Normal appearing uterus with adhesions, tubes and ovaries       Apgars:    8  @ 1 minute /   9  @ 5 minutes          Procedure Details:     Patient was taken to the OR where she was prepped and draped in the usual sterile fashion with a catheter and a left tilt.  Anesthesia was found to be adequate.    A Pfannenstiel skin incision was made with the scalpel and carried through to the underlying layer of fascia with the scalpel.  The fascial incision was extended laterally with the Greene scissors and  from the underlying rectus muscles superiorly.  The rectus muscles were  in the midline and the peritoneum was entered bluntly.  The peritoneal incision was extended laterally. There were dense adhesions from the anterior uterus to the anterior abdominal wall. These were taken down with the bovie. The Víctor retractor was placed.  The bladder flap was created sharply.    A low transverse uterine incision was made with the scalpel and extended manually.    The infant's head, shoulders, and body were delivered without difficulty.  Nose and mouth were bulb suctioned.  The cord was clamped and cut.  The infant was shown to the parents then handed to awaiting nurse with good cry, color, tone, and movement of all extremities.   Cord gasses and blood were obtained.   Placenta was delivered spontaneously intact with a 3-vessel cord.    The uterus was exteriorized and cleared of all clots and debris.    The uterine  incision was repaired with 0 Monocryl in a running, locked fashion and excellent hemostasis was achieved.   The posterior cul-de-sac was wiped clean.   The uterus was returned to the abdomen, the gutters were cleared of all clots and debris.  The uterine incision was examined and was hemostatic after surgicel was placed in this area.     The peritoneum was reapproximated with 3-0 Monocryl in a running fashion.  The rectus muscles were reapproximated with 0 Monocryl in several simple interrupted sutures.    The fascia was closed with 0 Vicryl in a running fashion.    The subcutaneous space was irrigated and closed with 3-0 Monocryl.    The skin was closed with staples.  Sponge, lap, needle and instrument counts were correct x 2.        Complications:   None      Disposition:   Recovery room then postpartum.          Agata Schultz MD  7/9/2024  19:08 EDT

## 2024-07-10 LAB
BASOPHILS # BLD AUTO: 0.02 10*3/MM3 (ref 0–0.2)
BASOPHILS NFR BLD AUTO: 0.2 % (ref 0–1.5)
DEPRECATED RDW RBC AUTO: 46.4 FL (ref 37–54)
EOSINOPHIL # BLD AUTO: 0.08 10*3/MM3 (ref 0–0.4)
EOSINOPHIL NFR BLD AUTO: 0.9 % (ref 0.3–6.2)
ERYTHROCYTE [DISTWIDTH] IN BLOOD BY AUTOMATED COUNT: 18.8 % (ref 12.3–15.4)
HCT VFR BLD AUTO: 26.9 % (ref 34–46.6)
HGB BLD-MCNC: 7.7 G/DL (ref 12–15.9)
IMM GRANULOCYTES # BLD AUTO: 0.02 10*3/MM3 (ref 0–0.05)
IMM GRANULOCYTES NFR BLD AUTO: 0.2 % (ref 0–0.5)
LYMPHOCYTES # BLD AUTO: 2.56 10*3/MM3 (ref 0.7–3.1)
LYMPHOCYTES NFR BLD AUTO: 29.6 % (ref 19.6–45.3)
MCH RBC QN AUTO: 20.1 PG (ref 26.6–33)
MCHC RBC AUTO-ENTMCNC: 28.6 G/DL (ref 31.5–35.7)
MCV RBC AUTO: 70.1 FL (ref 79–97)
MONOCYTES # BLD AUTO: 0.59 10*3/MM3 (ref 0.1–0.9)
MONOCYTES NFR BLD AUTO: 6.8 % (ref 5–12)
NEUTROPHILS NFR BLD AUTO: 5.37 10*3/MM3 (ref 1.7–7)
NEUTROPHILS NFR BLD AUTO: 62.3 % (ref 42.7–76)
NRBC BLD AUTO-RTO: 0 /100 WBC (ref 0–0.2)
PLATELET # BLD AUTO: 353 10*3/MM3 (ref 140–450)
PMV BLD AUTO: 9.9 FL (ref 6–12)
RBC # BLD AUTO: 3.84 10*6/MM3 (ref 3.77–5.28)
WBC NRBC COR # BLD AUTO: 8.64 10*3/MM3 (ref 3.4–10.8)

## 2024-07-10 PROCEDURE — 85025 COMPLETE CBC W/AUTO DIFF WBC: CPT | Performed by: OBSTETRICS & GYNECOLOGY

## 2024-07-10 PROCEDURE — 25010000002 ENOXAPARIN PER 10 MG: Performed by: OBSTETRICS & GYNECOLOGY

## 2024-07-10 PROCEDURE — 97161 PT EVAL LOW COMPLEX 20 MIN: CPT

## 2024-07-10 PROCEDURE — 25010000002 KETOROLAC TROMETHAMINE PER 15 MG: Performed by: OBSTETRICS & GYNECOLOGY

## 2024-07-10 RX ORDER — FERROUS SULFATE 324(65)MG
324 TABLET, DELAYED RELEASE (ENTERIC COATED) ORAL 2 TIMES DAILY WITH MEALS
Status: DISCONTINUED | OUTPATIENT
Start: 2024-07-10 | End: 2024-07-12 | Stop reason: HOSPADM

## 2024-07-10 RX ADMIN — KETOROLAC TROMETHAMINE 15 MG: 30 INJECTION, SOLUTION INTRAMUSCULAR at 14:01

## 2024-07-10 RX ADMIN — ENOXAPARIN SODIUM 40 MG: 100 INJECTION SUBCUTANEOUS at 20:19

## 2024-07-10 RX ADMIN — FERROUS SULFATE TAB EC 324 MG (65 MG FE EQUIVALENT) 324 MG: 324 (65 FE) TABLET DELAYED RESPONSE at 09:54

## 2024-07-10 RX ADMIN — ACETAMINOPHEN 1000 MG: 500 TABLET, FILM COATED ORAL at 16:51

## 2024-07-10 RX ADMIN — KETOROLAC TROMETHAMINE 15 MG: 30 INJECTION, SOLUTION INTRAMUSCULAR at 07:50

## 2024-07-10 RX ADMIN — KETOROLAC TROMETHAMINE 15 MG: 30 INJECTION, SOLUTION INTRAMUSCULAR at 01:52

## 2024-07-10 RX ADMIN — KETOROLAC TROMETHAMINE 15 MG: 30 INJECTION, SOLUTION INTRAMUSCULAR at 20:19

## 2024-07-10 RX ADMIN — FERROUS SULFATE TAB EC 324 MG (65 MG FE EQUIVALENT) 324 MG: 324 (65 FE) TABLET DELAYED RESPONSE at 18:07

## 2024-07-10 RX ADMIN — ACETAMINOPHEN 1000 MG: 500 TABLET, FILM COATED ORAL at 05:17

## 2024-07-10 RX ADMIN — ACETAMINOPHEN 1000 MG: 500 TABLET, FILM COATED ORAL at 11:06

## 2024-07-10 RX ADMIN — ACETAMINOPHEN 650 MG: 325 TABLET, FILM COATED ORAL at 23:03

## 2024-07-10 NOTE — PROGRESS NOTES
SIRISHA Duran  Postpartum Note    Subjective   Postpartum Day 1:  Repeat Low Transverse  Section    Patient without complaints. Her pain is well controlled with nonsteroidal anti-inflammatory drugs and prescribed pain medications. She is ambulating well.  Patient describes her bleeding as thin lochia.  Lochia appropriate for PP period.  Patient ambulating without assistance and Coughlin removed at 0500 this morning.  Encouraged to void.  Reports minimal flatus- ambulation encouraged.  Pt w/ gHTN and BNP WNR, denies preE s/sx's.  Hgb 10.2 to 7.7 and patient denies sx's.  Reports ambulating several times since delivery without concerns.  Discussed IV verses po Iron and patinet declines IV at this time.  Desires FeSO4 po and will monitor sx's and notify RN with any concerns.        Breastfeeding: infant latching without difficulty without pain.    Objective     Vitals:  Vitals:    24 2050 24 2155 24 2300 07/10/24 0300   BP: 102/73 121/78 126/90 135/87   BP Location: Right arm Right arm Right arm Right arm   Patient Position: Lying Lying Sitting Lying   Pulse: 78 77 73 80   Resp: 16 17  18   Temp: 98.4 °F (36.9 °C) 97.7 °F (36.5 °C) 97.3 °F (36.3 °C) 98.4 °F (36.9 °C)   TempSrc: Oral Oral Oral Oral   SpO2: 100% 98% 98% 98%   Weight:       Height:           Physical Exam:  General:  Alert and oriented x3. No acute distress.  Abdomen: abdomen is soft without significant tenderness, masses, organomegaly or guarding. Fundus: appropriate, firm, non tender  Incision: Clean/Dry/Intact, Bandage in Place, Staples intact, and covaderm in place  Skin: Warm, Dry  Extremities: Normal,  trace edema. Nontender     Labs:  Results from last 7 days   Lab Units 07/10/24  0524 24  1433   WBC 10*3/mm3 8.64 9.10   HEMOGLOBIN g/dL 7.7* 10.2*   HEMATOCRIT % 26.9* 35.0   PLATELETS 10*3/mm3 353 431            Feeding method: Breastfeeding Status: Yes     Blood Type: RH Negative Infant RH Negative        Assessment & Plan      Principal Problem:    Pregnant and not yet delivered  Active Problems:    Gestational htn w/o significant proteinuria, third trimester      Adam Trinh is Day 1  post-partum from a  Repeat Low Transverse  Section      Plan:  routine, continue present management, encourage ambulation, repeat CBC in AM, monitor BPs, and monitor pain.       Gemini Sanderson, JANELLE  7/10/2024  07:16 EDT

## 2024-07-10 NOTE — ANESTHESIA POSTPROCEDURE EVALUATION
Patient: Adam Trinh    Procedure Summary       Date: 24 Room / Location: [facility] LABOR DELIVERY  [facility] LABOR DELIVERY    Anesthesia Start:  Anesthesia Stop:     Procedure:  SECTION REPEAT (Abdomen) Diagnosis:       Pregnant and not yet delivered in third trimester      (Pregnant and not yet delivered in third trimester [Z34.93])    Surgeons: Agata Schultz MD Provider: Seth Romero MD    Anesthesia Type: spinal ASA Status: 3            Anesthesia Type: spinal    Vitals  Vitals Value Taken Time   /73 24   Temp 98.4 °F (36.9 °C) 24   Pulse 78 24   Resp 16 24   SpO2 100 % 24           Post Anesthesia Care and Evaluation    Patient location during evaluation: PACU  Patient participation: complete - patient participated  Level of consciousness: awake  Pain scale: See nurse's notes for pain score.  Pain management: adequate    Airway patency: patent  Anesthetic complications: No anesthetic complications  PONV Status: none  Cardiovascular status: acceptable  Respiratory status: acceptable and spontaneous ventilation  Hydration status: acceptable  Post Neuraxial Block status: Motor and sensory function returned to baseline and No signs or symptoms of PDPH  Comments: Patient seen and examined postoperatively; vital signs stable; SpO2 greater than or equal to 90%; cardiopulmonary status stable; nausea/vomiting adequately controlled; pain adequately controlled; no apparent anesthesia complications; patient discharged from anesthesia care when discharge criteria were met

## 2024-07-10 NOTE — PLAN OF CARE
Goal Outcome Evaluation:  Plan of Care Reviewed With: patient, spouse        Progress: improving  Outcome Evaluation: Delivered viable infant girl via repeat c/s, mom tolerated delivery well, plans to breastfeed infant, skin to skin preformed

## 2024-07-10 NOTE — LACTATION NOTE
This note was copied from a baby's chart.  Provided mother with community support info, nipple cream and gel pads, instructed on use. Basic teaching done. Mother states she just finished feeding the baby. Encouraged to call for feeding observation. Mother denies history of breast surgery. Denies use of routine medication.  Denies wool allergy. Has a Motif breastpump. States she  her first child for a month.

## 2024-07-10 NOTE — PLAN OF CARE
Goal Outcome Evaluation:  Plan of Care Reviewed With: patient, spouse           Outcome Evaluation: 28 y/o female ,, admitted on  for repeat c section at 38w2d. Patient lives with spouse and has adequate support at home. Educated and issued hand out on how to care for body following c section; emphasis on body mechanics, deep breathing exercises as well as stretching and scar management. Patient and spouse verbalized understanding.      Anticipated Discharge Disposition (PT): home with assist

## 2024-07-10 NOTE — THERAPY EVALUATION
Patient Name: Adam Trinh  : 1995    MRN: [id number]                              Today's Date: 7/10/2024       Admit Date: 2024    Visit Dx:     ICD-10-CM ICD-9-CM   1. Pregnant and not yet delivered in third trimester  Z34.93 V22.1     Patient Active Problem List   Diagnosis    Pregnant and not yet delivered    Pregnancy    Gestational htn w/o significant proteinuria, third trimester     Past Medical History:   Diagnosis Date    Gestational hypertension     Hidradenitis suppurativa     Urinary tract infection      Past Surgical History:   Procedure Laterality Date     SECTION       SECTION N/A 2024    Procedure:  SECTION REPEAT;  Surgeon: Agata Schultz MD;  Location: [facility];  Service: Gynecology;  Laterality: N/A;      General Information       Row Name 07/10/24 1001          Physical Therapy Time and Intention    Document Type evaluation  -CR     Mode of Treatment physical therapy  -CR       Row Name 07/10/24 1001          General Information    Patient Profile Reviewed yes  -CR     Prior Level of Function independent:;all household mobility;community mobility  -CR     Existing Precautions/Restrictions no known precautions/restrictions  abd incision  -CR     Barriers to Rehab none identified  -CR       Row Name 07/10/24 1001          Living Environment    People in Home spouse  -CR       Row Name 07/10/24 1001          Home Main Entrance    Number of Stairs, Main Entrance one  -CR       Row Name 07/10/24 1001          Cognition    Orientation Status (Cognition) oriented x 4  -CR       Row Name 07/10/24 1001          Safety Issues, Functional Mobility    Impairments Affecting Function (Mobility) pain  -CR               User Key  (r) = Recorded By, (t) = Taken By, (c) = Cosigned By      Initials Name Provider Type    CR Reyes, Carmela, PT Physical Therapist                   Mobility       Row Name 07/10/24 1001          Bed Mobility    Comment, (Bed  Mobility) patient breastfeeding at time of eval  -CR               User Key  (r) = Recorded By, (t) = Taken By, (c) = Cosigned By      Initials Name Provider Type    CR Reyes, Carmela, PT Physical Therapist                   Obj/Interventions       Row Name 07/10/24 1002          Range of Motion Comprehensive    General Range of Motion no range of motion deficits identified  -CR       Row Name 07/10/24 1002          Strength Comprehensive (MMT)    General Manual Muscle Testing (MMT) Assessment no strength deficits identified  -CR       Row Name 07/10/24 1002          Sensory Assessment (Somatosensory)    Sensory Assessment (Somatosensory) sensation intact  -CR               User Key  (r) = Recorded By, (t) = Taken By, (c) = Cosigned By      Initials Name Provider Type    CR Reyes, Carmela, PT Physical Therapist                   Goals/Plan    No documentation.                  Clinical Impression       Row Name 07/10/24 1002          Pain    Additional Documentation Pain Scale: FACES Pre/Post-Treatment (Group)  -CR       Row Name 07/10/24 1002          Pain Scale: FACES Pre/Post-Treatment    Pain: FACES Scale, Pretreatment 2-->hurts little bit  -CR     Posttreatment Pain Rating 2-->hurts little bit  -CR     Pain Location incisional  -CR     Pain Location - abdomen  -CR       Row Name 07/10/24 1002          Plan of Care Review    Plan of Care Reviewed With patient;spouse  -CR     Outcome Evaluation 30 y/o female ,, admitted on  for repeat c section at 38w2d. Patient lives with spouse and has adequate support at home. Educated and issued hand out on how to care for body following c section; emphasis on body mechanics, deep breathing exercises as well as stretching and scar management. Patient and spouse verbalized understanding.  -CR       Row Name 07/10/24 1002          Therapy Assessment/Plan (PT)    Patient/Family Therapy Goals Statement (PT) home  -CR     Criteria for Skilled Interventions Met (PT)  no;does not meet criteria for skilled intervention  -CR     Therapy Frequency (PT) evaluation only  -CR               User Key  (r) = Recorded By, (t) = Taken By, (c) = Cosigned By      Initials Name Provider Type    CR Reyes, Carmela, PT Physical Therapist                   Outcome Measures       Row Name 07/10/24 0750          How much help from another person do you currently need...    Turning from your back to your side while in flat bed without using bedrails? 4  -MN     Moving from lying on back to sitting on the side of a flat bed without bedrails? 4  -MN     Moving to and from a bed to a chair (including a wheelchair)? 4  -MN     Standing up from a chair using your arms (e.g., wheelchair, bedside chair)? 4  -MN     Climbing 3-5 steps with a railing? 4  -MN     To walk in hospital room? 4  -MN     AM-PAC 6 Clicks Score (PT) 24  -MN     Highest Level of Mobility Goal 8 --> Walked 250 feet or more  -MN               User Key  (r) = Recorded By, (t) = Taken By, (c) = Cosigned By      Initials Name Provider Type    Christa Chung, RN Registered Nurse                                 Physical Therapy Education       Title: PT OT SLP Therapies (Done)       Topic: Physical Therapy (Done)       Point: Home exercise program (Done)       Learning Progress Summary             Patient Acceptance, E,H,D, VU by CR at 7/10/2024 1005   Significant Other Acceptance, E,H,D, VU by CR at 7/10/2024 1005                         Point: Body mechanics (Done)       Learning Progress Summary             Patient Acceptance, E,H,D, VU by CR at 7/10/2024 1005   Significant Other Acceptance, E,H,D, VU by CR at 7/10/2024 1005                                         User Key       Initials Effective Dates Name Provider Type Discipline    CR 21 -  Reyes, Carmela, PT Physical Therapist PT                  PT Recommendation and Plan     Plan of Care Reviewed With: patient, spouse  Outcome Evaluation: 30 y/o female ,, admitted  on 7/9 for repeat c section at 38w2d. Patient lives with spouse and has adequate support at home. Educated and issued hand out on how to care for body following c section; emphasis on body mechanics, deep breathing exercises as well as stretching and scar management. Patient and spouse verbalized understanding.     Time Calculation:         PT Charges       Row Name 07/10/24 1005             Time Calculation    Start Time 0938  -CR      Stop Time 0946  -CR      Time Calculation (min) 8 min  -CR      PT Received On 07/10/24  -CR         Time Calculation- PT    Total Timed Code Minutes- PT 0 minute(s)  -CR                User Key  (r) = Recorded By, (t) = Taken By, (c) = Cosigned By      Initials Name Provider Type    CR Reyes, Carmela, PT Physical Therapist                  Therapy Charges for Today       Code Description Service Date Service Provider Modifiers Qty    37347096278 HC PT EVAL LOW COMPLEXITY 2 7/10/2024 Reyes, Carmela, PT GP 1            PT G-Codes  AM-PAC 6 Clicks Score (PT): 24  PT Discharge Summary  Anticipated Discharge Disposition (PT): home with assist    Carmela Reyes, PT  7/10/2024

## 2024-07-10 NOTE — PLAN OF CARE
Goal Outcome Evaluation:      Pt pain well controlled with scheduled pain meds. Fundus and bleeding wdl, pressure dressing in place, no drainage noted. Pt has ambulated to bathroom with stand by assistance, no symptoms reported. Infant breastfeeding well, pt edu on different holds to assist with breastfeeding. No concerns at this time.

## 2024-07-11 PROCEDURE — 25010000002 ENOXAPARIN PER 10 MG: Performed by: OBSTETRICS & GYNECOLOGY

## 2024-07-11 RX ADMIN — IBUPROFEN 600 MG: 600 TABLET, FILM COATED ORAL at 07:27

## 2024-07-11 RX ADMIN — ACETAMINOPHEN 650 MG: 325 TABLET, FILM COATED ORAL at 05:00

## 2024-07-11 RX ADMIN — ACETAMINOPHEN 650 MG: 325 TABLET, FILM COATED ORAL at 23:20

## 2024-07-11 RX ADMIN — ACETAMINOPHEN 650 MG: 325 TABLET, FILM COATED ORAL at 11:25

## 2024-07-11 RX ADMIN — ENOXAPARIN SODIUM 40 MG: 100 INJECTION SUBCUTANEOUS at 10:01

## 2024-07-11 RX ADMIN — ACETAMINOPHEN 650 MG: 325 TABLET, FILM COATED ORAL at 17:07

## 2024-07-11 RX ADMIN — IBUPROFEN 600 MG: 600 TABLET, FILM COATED ORAL at 01:46

## 2024-07-11 RX ADMIN — FERROUS SULFATE TAB EC 324 MG (65 MG FE EQUIVALENT) 324 MG: 324 (65 FE) TABLET DELAYED RESPONSE at 07:27

## 2024-07-11 RX ADMIN — IBUPROFEN 600 MG: 600 TABLET, FILM COATED ORAL at 13:44

## 2024-07-11 RX ADMIN — FERROUS SULFATE TAB EC 324 MG (65 MG FE EQUIVALENT) 324 MG: 324 (65 FE) TABLET DELAYED RESPONSE at 17:07

## 2024-07-11 RX ADMIN — IBUPROFEN 600 MG: 600 TABLET, FILM COATED ORAL at 20:04

## 2024-07-11 NOTE — LACTATION NOTE
This note was copied from a baby's chart.  Pt visited, states she has continued to feed baby well, more recently about 1 hr ago, denies lactation questions or needs, encouraged continue feeding on demand and call for feeding observation.

## 2024-07-11 NOTE — PLAN OF CARE
Goal Outcome Evaluation:      Pt is A&Ox4, on room air, up ad jaron. Pain being controlled.

## 2024-07-11 NOTE — PROGRESS NOTES
[facility]  Postpartum Note    Subjective   Postpartum Day 2:  Repeat Low Transverse  Section    Patient without complaints. Her pain is well controlled with nonsteroidal anti-inflammatory drugs and prescribed pain medications. She is ambulating well.  Patient describes her bleeding as thin lochia.    Breastfeeding: infant latching without difficulty without pain.    Objective     Vitals:  Vitals:    07/10/24 1145 07/10/24 1540 07/10/24 2355 24 0824   BP: 117/67 91/60 124/89 135/83   BP Location: Right arm Right arm Right arm Right arm   Patient Position: Sitting Lying Lying Lying   Pulse: 99 94 101 71   Resp: 17 15 17 17   Temp: 98.7 °F (37.1 °C) 97.8 °F (36.6 °C) 98.3 °F (36.8 °C) 98.3 °F (36.8 °C)   TempSrc: Oral Oral Oral Oral   SpO2: 96% 95% 97% 99%   Weight:       Height:           Physical Exam:  General:  Alert and oriented x3. No acute distress.  Abdomen: abdomen is soft without significant tenderness, masses, organomegaly or guarding. Fundus: appropriate, firm, non tender  Incision: Clean/Dry/Intact and Staples intact  Skin: Warm, Dry  Extremities: Normal,  trace edema. Nontender     Labs:  Results from last 7 days   Lab Units 07/10/24  0524 24  1433   WBC 10*3/mm3 8.64 9.10   HEMOGLOBIN g/dL 7.7* 10.2*   HEMATOCRIT % 26.9* 35.0   PLATELETS 10*3/mm3 353 431            Feeding method: Breastfeeding Status: Yes     Blood Type: RH Negative Infant RH Negative        Assessment & Plan     Principal Problem:    Pregnant and not yet delivered  Active Problems:    Gestational htn w/o significant proteinuria, third trimester      Adam Trinh is Day 2  post-partum from a  Repeat Low Transverse  Section      Plan:  routine, continue present management, encourage ambulation, monitor BPs, monitor pain, and plan d/c tomorrow.       Gemini Sanderson, APRN  2024  09:25 EDT

## 2024-07-11 NOTE — PLAN OF CARE
Goal Outcome Evaluation:  Plan of Care Reviewed With: patient           Outcome Evaluation: Pt's pain controlled with scheduled medications. Pt breastfeeding infant well.

## 2024-07-12 VITALS
BODY MASS INDEX: 42.27 KG/M2 | WEIGHT: 238.54 LBS | RESPIRATION RATE: 20 BRPM | HEIGHT: 63 IN | DIASTOLIC BLOOD PRESSURE: 86 MMHG | OXYGEN SATURATION: 98 % | TEMPERATURE: 98.3 F | HEART RATE: 94 BPM | SYSTOLIC BLOOD PRESSURE: 134 MMHG

## 2024-07-12 RX ORDER — FERROUS SULFATE 324(65)MG
324 TABLET, DELAYED RELEASE (ENTERIC COATED) ORAL 2 TIMES DAILY WITH MEALS
Qty: 30 TABLET | Refills: 0 | Status: SHIPPED | OUTPATIENT
Start: 2024-07-12

## 2024-07-12 RX ORDER — IBUPROFEN 600 MG/1
600 TABLET ORAL EVERY 6 HOURS PRN
Qty: 30 TABLET | Refills: 0 | Status: SHIPPED | OUTPATIENT
Start: 2024-07-12

## 2024-07-12 RX ORDER — PSEUDOEPHEDRINE HCL 30 MG
100 TABLET ORAL 2 TIMES DAILY PRN
Qty: 20 CAPSULE | Refills: 0 | Status: SHIPPED | OUTPATIENT
Start: 2024-07-12

## 2024-07-12 RX ADMIN — ACETAMINOPHEN 650 MG: 325 TABLET, FILM COATED ORAL at 05:04

## 2024-07-12 RX ADMIN — ACETAMINOPHEN 650 MG: 325 TABLET, FILM COATED ORAL at 12:43

## 2024-07-12 RX ADMIN — FERROUS SULFATE TAB EC 324 MG (65 MG FE EQUIVALENT) 324 MG: 324 (65 FE) TABLET DELAYED RESPONSE at 08:53

## 2024-07-12 RX ADMIN — IBUPROFEN 600 MG: 600 TABLET, FILM COATED ORAL at 08:53

## 2024-07-12 RX ADMIN — IBUPROFEN 600 MG: 600 TABLET, FILM COATED ORAL at 01:59

## 2024-07-12 RX ADMIN — IBUPROFEN 600 MG: 600 TABLET, FILM COATED ORAL at 14:49

## 2024-07-12 NOTE — DISCHARGE SUMMARY
[facility]  Delivery Discharge Summary    Primary OB Clinician: Agata Schultz MD    Admission Diagnosis:  Principal Problem:    Pregnant and not yet delivered  Active Problems:    Gestational htn w/o significant proteinuria, third trimester      Discharge Diagnosis:  Same, delivered    Gestational Age: 38w2d    Date of Delivery: 2024     Delivered By:  Agata Schultz     Delivery Type: , Low Transverse      Tubal Ligation: n/a    Intrapartum Course: Uncomplicated delivery.     Postpartum Course:  Pt was admitted and underwent  Repeat Low Transverse  Section. Pt was transferred to PP where she had an uncomplicated course. Pt remained AFVSS, had scant lochia and pain was well controlled. Pt ambulating and voiding spontaneously. Bleeding appropriate for PP period. Pt has had BM w/o difficulty. Hgb stable and pt asymptomatic. Tolerating PO FeSO4. Pt with hx gHTN, Bps mostly normotensive with a few mid-range. Pt denies preE s/s. Pt d/c home in stable condition and will f/u in office for PP visit as scheduled and or PRN. Pt to RTO Monday for staple removal and BP check. Currently breastfeeding. Plans on  POP   for contraception.     Physical Exam:    Vitals:   Vitals:    24 0824 24 1549 24 2325 24 0806   BP: 135/83 105/75 136/82 134/86   BP Location: Right arm Right arm Left arm Left arm   Patient Position: Lying Lying Lying Sitting   Pulse: 71 108 96 94   Resp: 17  22 20   Temp: 98.3 °F (36.8 °C) 97.6 °F (36.4 °C) 98.6 °F (37 °C) 98.3 °F (36.8 °C)   TempSrc: Oral Oral Oral Oral   SpO2: 99% 98% 98% 98%   Weight:   108 kg (238 lb 8.6 oz)    Height:         Temp (24hrs), Av.2 °F (36.8 °C), Min:97.6 °F (36.4 °C), Max:98.6 °F (37 °C)      General Appearance:    Alert, cooperative, in no acute distress   Abdomen:     Soft non-tender, non-distended, no guarding, no rebound         tenderness.   Extremities:   Moves all extremities well, no edema, no cyanosis, no               Redness.   Incision:  Clean/Dry/Intact and Staples intact   Fundus:   Firm, below umbilicus     Feeding method: Breastfeeding Status: Yes    Labs:  Results from last 7 days   Lab Units 07/10/24  0524 07/09/24  1433   WBC 10*3/mm3 8.64 9.10   HEMOGLOBIN g/dL 7.7* 10.2*   HEMATOCRIT % 26.9* 35.0   PLATELETS 10*3/mm3 353 431           Blood Type: RH Negative Infant RH Negative      Plan:  Discharge to home.    Follow-up appointment with Dr. Schultz in 6 weeks.   RTO Monday for BP check and staple removal.   Hx gHTN, Bps mostly normotensive with a few mid-range. Pt denies preE s/s. Pre s/s reviewed.  Anemia s/p delivery, asymptomatic. Cont PO FeSO4 BID.   All discharge instructions were reviewed with pt including bleeding warnings, s/sx of PP depression, and warning signs in the PP period for which to seek medical attention including but not limited to s/sx of hypertension and thromboembolism.     Abigail Barakat, APRN  7/12/2024  12:53 EDT

## 2024-07-12 NOTE — PLAN OF CARE
Goal Outcome Evaluation:  Plan of Care Reviewed With: patient        Progress: improving  Outcome Evaluation: pt voiding and ambulating adequately. pts pain controlled with scheduled medications. pts VSS. pt okay to d/c home per APRN.

## 2024-07-12 NOTE — LACTATION NOTE
This note was copied from a baby's chart.  Mother reports that feeding are going well. Milk is coming in. Nipples tender at the end of the feedings. Is using nipple care products and feels like that is helping. Declines feeding observation. Plans for discharge today. Discussed first night at home. Provided with  discharge weight ticket and lactation contact card. Encouraged to call as needed.

## 2024-07-12 NOTE — PLAN OF CARE
Goal Outcome Evaluation:  Plan of Care Reviewed With: patient        Progress: improving  Outcome Evaluation: Pt's pain controlled with scheduled medications.

## 2024-07-12 NOTE — CASE MANAGEMENT/SOCIAL WORK
Discharge Planning Assessment   [facility]     Patient Name: Adam Trinh  MRN: [id number]  Today's Date: 7/12/2024    Admit Date: 7/9/2024        Discharge Needs Assessment    No documentation.                  Discharge Plan       Row Name 07/12/24 1513       Plan    Final Discharge Disposition Code 01 - home or self-care                  Continued Care and Services - Discharged on 7/12/2024 Admission date: 7/9/2024 - Discharge disposition: Home or Self Care   No active coordination exists for this encounter.       Expected Discharge Date and Time       Expected Discharge Date Expected Discharge Time    Jul 12, 2024            Demographic Summary    No documentation.                  Functional Status    No documentation.                  Psychosocial    No documentation.                  Abuse/Neglect    No documentation.                  Legal    No documentation.                  Substance Abuse    No documentation.                  Patient Forms    No documentation.                     Margot Gutierrez RN

## 2024-07-16 LAB
LAB AP CASE REPORT: NORMAL
LAB AP DIAGNOSIS COMMENT: NORMAL
PATH REPORT.FINAL DX SPEC: NORMAL
PATH REPORT.GROSS SPEC: NORMAL

## 2024-07-22 ENCOUNTER — MATERNAL SCREENING (OUTPATIENT)
Dept: CALL CENTER | Facility: HOSPITAL | Age: 29
End: 2024-07-22
Payer: COMMERCIAL

## 2024-07-23 ENCOUNTER — MATERNAL SCREENING (OUTPATIENT)
Dept: CALL CENTER | Facility: HOSPITAL | Age: 29
End: 2024-07-23
Payer: COMMERCIAL

## 2024-07-23 NOTE — OUTREACH NOTE
Maternal Screening Survey      Flowsheet Row Responses   Facility patient discharged from? [facility]   Attempt successful? No   Unsuccessful attempts Attempt 3   Revoke Decline to participate              [place] MEDRANO - Registered Nurse

## (undated) DEVICE — SOL IRRIG H2O 1000ML STRL

## (undated) DEVICE — TRY SADDLE BLCK 25G

## (undated) DEVICE — VIOLET BRAIDED (POLYGLACTIN 910), SYNTHETIC ABSORBABLE SUTURE: Brand: COATED VICRYL

## (undated) DEVICE — GLV SURG SENSICARE PI MIC PF SZ6.5 LF STRL

## (undated) DEVICE — SOL IRRIG NACL 9PCT 1000ML BTL

## (undated) DEVICE — SUT MONOCRYL 1 CT1 36IN MCP347H

## (undated) DEVICE — SUT MNCRYL 3/0 CT1 36 IN Y944H

## (undated) DEVICE — SPNG LAP PREWSH SFTPK 18X18IN STRL PK/5

## (undated) DEVICE — DRSNG WND BORDR/ADHS NONADHR/GZ LF 4X10IN STRL

## (undated) DEVICE — Device

## (undated) DEVICE — PK C SECT 50